# Patient Record
Sex: MALE | Race: WHITE | NOT HISPANIC OR LATINO | URBAN - METROPOLITAN AREA
[De-identification: names, ages, dates, MRNs, and addresses within clinical notes are randomized per-mention and may not be internally consistent; named-entity substitution may affect disease eponyms.]

---

## 2017-07-18 ENCOUNTER — FOLLOW UP ESTABLISHED (OUTPATIENT)
Dept: URBAN - METROPOLITAN AREA CLINIC 51 | Facility: CLINIC | Age: 72
End: 2017-07-18
Payer: COMMERCIAL

## 2017-07-18 DIAGNOSIS — H35.372 PUCKERING OF MACULA, LEFT EYE: ICD-10-CM

## 2017-07-18 DIAGNOSIS — H16.143 PUNCTATE KERATITIS, BILATERAL: ICD-10-CM

## 2017-07-18 DIAGNOSIS — I10 ESSENTIAL (PRIMARY) HYPERTENSION: ICD-10-CM

## 2017-07-18 DIAGNOSIS — E11.9 TYPE 2 DIABETES MELLITUS WITHOUT COMPLICATIONS: Primary | ICD-10-CM

## 2017-07-18 DIAGNOSIS — H52.4 PRESBYOPIA: ICD-10-CM

## 2017-07-18 PROCEDURE — 92014 COMPRE OPH EXAM EST PT 1/>: CPT | Performed by: OPTOMETRIST

## 2017-07-18 PROCEDURE — 92250 FUNDUS PHOTOGRAPHY W/I&R: CPT | Performed by: OPTOMETRIST

## 2017-07-18 ASSESSMENT — KERATOMETRY
OD: 43.63
OS: 43.35

## 2017-07-18 ASSESSMENT — VISUAL ACUITY
OD: 20/30
OS: 20/20

## 2017-07-18 ASSESSMENT — INTRAOCULAR PRESSURE
OD: 11
OS: 10

## 2018-07-16 ENCOUNTER — FOLLOW UP ESTABLISHED (OUTPATIENT)
Dept: URBAN - METROPOLITAN AREA CLINIC 51 | Facility: CLINIC | Age: 73
End: 2018-07-16
Payer: COMMERCIAL

## 2018-07-16 DIAGNOSIS — H35.373 PUCKERING OF MACULA, BILATERAL: ICD-10-CM

## 2018-07-16 DIAGNOSIS — Z96.1 PRESENCE OF INTRAOCULAR LENS: ICD-10-CM

## 2018-07-16 PROCEDURE — 92015 DETERMINE REFRACTIVE STATE: CPT | Performed by: OPTOMETRIST

## 2018-07-16 PROCEDURE — 92014 COMPRE OPH EXAM EST PT 1/>: CPT | Performed by: OPTOMETRIST

## 2018-07-16 PROCEDURE — 92134 CPTRZ OPH DX IMG PST SGM RTA: CPT | Performed by: OPTOMETRIST

## 2018-07-16 ASSESSMENT — VISUAL ACUITY
OS: 20/25
OD: 20/40

## 2018-07-16 ASSESSMENT — KERATOMETRY
OS: 43.70
OD: 43.75

## 2018-07-16 ASSESSMENT — INTRAOCULAR PRESSURE
OS: 10
OD: 11

## 2019-09-03 ENCOUNTER — FOLLOW UP ESTABLISHED (OUTPATIENT)
Dept: URBAN - METROPOLITAN AREA CLINIC 51 | Facility: CLINIC | Age: 74
End: 2019-09-03
Payer: COMMERCIAL

## 2019-09-03 DIAGNOSIS — H25.811 COMBINED FORMS OF AGE-RELATED CATARACT, RIGHT EYE: ICD-10-CM

## 2019-09-03 DIAGNOSIS — H04.123 TEAR FILM INSUFFICIENCY OF BILATERAL LACRIMAL GLANDS: ICD-10-CM

## 2019-09-03 PROCEDURE — 92015 DETERMINE REFRACTIVE STATE: CPT | Performed by: OPTOMETRIST

## 2019-09-03 PROCEDURE — 92014 COMPRE OPH EXAM EST PT 1/>: CPT | Performed by: OPTOMETRIST

## 2019-09-03 PROCEDURE — 92134 CPTRZ OPH DX IMG PST SGM RTA: CPT | Performed by: OPTOMETRIST

## 2019-09-03 RX ORDER — DIPHENHYDRAMINE HCL 25 MG
CAPSULE ORAL
Qty: 3 | Refills: 4 | Status: INACTIVE
Start: 2019-09-03 | End: 2020-10-30

## 2019-09-03 ASSESSMENT — VISUAL ACUITY
OS: 20/20
OD: 20/30

## 2019-09-03 ASSESSMENT — INTRAOCULAR PRESSURE
OD: 12
OS: 11

## 2019-09-03 ASSESSMENT — KERATOMETRY
OS: 43.47
OD: 43.44

## 2020-10-30 ENCOUNTER — FOLLOW UP ESTABLISHED (OUTPATIENT)
Dept: URBAN - METROPOLITAN AREA CLINIC 51 | Facility: CLINIC | Age: 75
End: 2020-10-30
Payer: COMMERCIAL

## 2020-10-30 DIAGNOSIS — H01.025 SQUAMOUS BLEPHARITIS OF LEFT LOWER LID: ICD-10-CM

## 2020-10-30 DIAGNOSIS — H43.313 VITREOUS MEMBRANES AND STRANDS, BILATERAL: ICD-10-CM

## 2020-10-30 DIAGNOSIS — H01.024 SQUAMOUS BLEPHARITIS OF LEFT UPPER LID: ICD-10-CM

## 2020-10-30 DIAGNOSIS — H35.371 PUCKERING OF MACULA, RIGHT EYE: ICD-10-CM

## 2020-10-30 DIAGNOSIS — E10.9 TYPE 1 DIABETES MELLITUS WITHOUT COMPLICATIONS: Primary | ICD-10-CM

## 2020-10-30 DIAGNOSIS — H01.021 SQUAMOUS BLEPHARITIS OF RIGHT UPPER LID: ICD-10-CM

## 2020-10-30 DIAGNOSIS — Z79.4 LONG TERM (CURRENT) USE OF INSULIN: ICD-10-CM

## 2020-10-30 DIAGNOSIS — H01.022 SQUAMOUS BLEPHARITIS OF RIGHT LOWER LID: ICD-10-CM

## 2020-10-30 PROCEDURE — 92014 COMPRE OPH EXAM EST PT 1/>: CPT | Performed by: OPTOMETRIST

## 2020-10-30 PROCEDURE — 92134 CPTRZ OPH DX IMG PST SGM RTA: CPT | Performed by: OPTOMETRIST

## 2020-10-30 RX ORDER — DULAGLUTIDE 0.75 MG/.5ML
INJECTION, SOLUTION SUBCUTANEOUS
Qty: 0 | Refills: 0 | Status: INACTIVE
Start: 2020-10-30 | End: 2021-12-08

## 2020-10-30 ASSESSMENT — KERATOMETRY
OS: 43.44
OD: 43.10

## 2020-10-30 ASSESSMENT — VISUAL ACUITY
OS: 20/20
OD: 20/40

## 2020-10-30 ASSESSMENT — INTRAOCULAR PRESSURE
OS: 12
OD: 12

## 2020-11-19 ENCOUNTER — NEW PATIENT (OUTPATIENT)
Dept: URBAN - METROPOLITAN AREA CLINIC 51 | Facility: CLINIC | Age: 75
End: 2020-11-19
Payer: COMMERCIAL

## 2020-11-19 DIAGNOSIS — G45.9 TRANSIENT CEREBRAL ISCHEMIC ATTACK, UNSPECIFIED: ICD-10-CM

## 2020-11-19 DIAGNOSIS — I48.91 UNSPECIFIED ATRIAL FIBRILLATION: ICD-10-CM

## 2020-11-19 DIAGNOSIS — E11.3293 DIABETES MELLITUS TYPE 2 WITH MILD NON-PROLIFERATI: ICD-10-CM

## 2020-11-19 DIAGNOSIS — Z79.84 LONG TERM (CURRENT) USE OF ORAL ANTIDIABETIC DRUGS: ICD-10-CM

## 2020-11-19 PROCEDURE — 92134 CPTRZ OPH DX IMG PST SGM RTA: CPT | Performed by: OPHTHALMOLOGY

## 2020-11-19 PROCEDURE — 99202 OFFICE O/P NEW SF 15 MIN: CPT | Performed by: OPHTHALMOLOGY

## 2020-11-19 PROCEDURE — 92235 FLUORESCEIN ANGRPH MLTIFRAME: CPT | Performed by: OPHTHALMOLOGY

## 2020-11-19 RX ORDER — OFLOXACIN 3 MG/ML
0.3 % SOLUTION/ DROPS OPHTHALMIC
Qty: 1 | Refills: 0 | Status: INACTIVE
Start: 2020-11-19 | End: 2020-11-25

## 2020-11-19 RX ORDER — PREDNISOLONE ACETATE 10 MG/ML
1 % SUSPENSION/ DROPS OPHTHALMIC
Qty: 1 | Refills: 0 | Status: INACTIVE
Start: 2020-11-19 | End: 2021-10-07

## 2020-11-19 ASSESSMENT — INTRAOCULAR PRESSURE
OD: 5
OS: 5

## 2021-10-07 ENCOUNTER — OFFICE VISIT (OUTPATIENT)
Dept: URBAN - METROPOLITAN AREA CLINIC 51 | Facility: CLINIC | Age: 76
End: 2021-10-07
Payer: COMMERCIAL

## 2021-10-07 DIAGNOSIS — H01.02A SQUAMOUS BLEPHARITIS RIGHT EYE, UPPER AND LOWER EYELIDS: ICD-10-CM

## 2021-10-07 DIAGNOSIS — H01.02B SQUAMOUS BLEPHARITIS LEFT EYE, UPPER AND LOWER EYELIDS: ICD-10-CM

## 2021-10-07 PROCEDURE — 92250 FUNDUS PHOTOGRAPHY W/I&R: CPT | Performed by: OPTOMETRIST

## 2021-10-07 PROCEDURE — 99214 OFFICE O/P EST MOD 30 MIN: CPT | Performed by: OPTOMETRIST

## 2021-10-07 ASSESSMENT — VISUAL ACUITY
OD: 20/30
OS: 20/20

## 2021-10-07 ASSESSMENT — INTRAOCULAR PRESSURE
OS: 9
OD: 9

## 2021-10-07 NOTE — IMPRESSION/PLAN
Impression: Squamous blepharitis right eye, upper and lower eyelids: H01.02A. Plan: See Plan H01. 02A

## 2021-10-07 NOTE — IMPRESSION/PLAN
Impression: Puckering of macula, bilateral: H35.373. Plan: OCT Macular Scan completed, reviewed and documented. Check annually or sooner if patient notices distortions or if there is a decline in vision. 
Understands this may limit BCVA post op OD

## 2021-10-07 NOTE — IMPRESSION/PLAN
Impression: Squamous blepharitis left eye, upper and lower eyelids: H01.02B. Plan: Educated patient regarding condition and importance of maintaining lid hygiene especially before and after cataract surgery.

## 2021-10-07 NOTE — IMPRESSION/PLAN
Impression: Type 2 diabetes mellitus without complications: D22.3. Plan: No Background Diabetic Retinopathy, no Diabetic Macular Edema and no Neovascularization of the iris, disc, or elsewhere. Disc. ocular and systemic benefits of blood sugar control. The American Diabetes Association recommends target glycohemoglobin at 7.0% or less to minimize incidence of retinopathy as well as other systemic complications of diabetes mellitus. Send notes to PCP. Check annually.

## 2021-10-07 NOTE — IMPRESSION/PLAN
Impression: Combined forms of age-related cataract, right eye Plan: Discussed cataracts with patient. Discussed treatment options. Surgical treatment is recommended. Surgical risks and benefits discussed. Patient elects surgical treatment. Recommend surgery OD. standard lens, Aim OD: plano. Patient will need glasses for all near work, including computer. Pt is not a candidate for Specialty IOL due to standard IOL already implanted in other eye (1st eye). He inquired about NEAR AIM OD, not a candidate. He understands. Medication Adherence Call    Patient was called today to discuss medication adherence with atorvastatin, as the patient was identified as having care opportunities.     The patient denies issues with adherence and denies any barriers to receiving medications. He is calling in his refill today.    Sonia Chase, PharmD  Population Health Pharmacist  06/29/21

## 2021-10-12 ENCOUNTER — OFFICE VISIT (OUTPATIENT)
Dept: URBAN - METROPOLITAN AREA CLINIC 51 | Facility: CLINIC | Age: 76
End: 2021-10-12
Payer: COMMERCIAL

## 2021-10-12 DIAGNOSIS — H43.813 VITREOUS DEGENERATION, BILATERAL: ICD-10-CM

## 2021-10-12 PROCEDURE — 92134 CPTRZ OPH DX IMG PST SGM RTA: CPT | Performed by: OPHTHALMOLOGY

## 2021-10-12 PROCEDURE — 99214 OFFICE O/P EST MOD 30 MIN: CPT | Performed by: OPHTHALMOLOGY

## 2021-10-12 ASSESSMENT — INTRAOCULAR PRESSURE
OD: 11
OS: 11

## 2021-10-12 NOTE — IMPRESSION/PLAN
Impression: Puckering of macula, bilateral: H35.373. Plan: moderate ERM OD>OS. unclear visual significance due to cataract OD. recommend proceed with cataract surgery OD and if vision is still limited reassess with retina RTC cataract surgery OD, PRN retina

## 2021-10-12 NOTE — IMPRESSION/PLAN
Impression: Combined forms of age-related cataract, right eye: H25.811.  Plan: visually significant, recommend cataract surgery OD

## 2021-12-08 ENCOUNTER — TESTING ONLY (OUTPATIENT)
Dept: URBAN - METROPOLITAN AREA CLINIC 51 | Facility: CLINIC | Age: 76
End: 2021-12-08
Payer: COMMERCIAL

## 2021-12-08 DIAGNOSIS — H25.11 AGE-RELATED NUCLEAR CATARACT, RIGHT EYE: Primary | ICD-10-CM

## 2021-12-08 DIAGNOSIS — Z01.818 ENCOUNTER FOR OTHER PREPROCEDURAL EXAMINATION: Primary | ICD-10-CM

## 2021-12-08 PROCEDURE — 99203 OFFICE O/P NEW LOW 30 MIN: CPT | Performed by: PHYSICIAN ASSISTANT

## 2021-12-08 PROCEDURE — 92025 CPTRIZED CORNEAL TOPOGRAPHY: CPT | Performed by: OPHTHALMOLOGY

## 2021-12-15 ENCOUNTER — PRE-OPERATIVE VISIT (OUTPATIENT)
Dept: URBAN - METROPOLITAN AREA CLINIC 44 | Facility: CLINIC | Age: 76
End: 2021-12-15
Payer: COMMERCIAL

## 2021-12-15 DIAGNOSIS — H52.221 REGULAR ASTIGMATISM, RIGHT EYE: ICD-10-CM

## 2021-12-15 PROCEDURE — 99214 OFFICE O/P EST MOD 30 MIN: CPT | Performed by: OPHTHALMOLOGY

## 2021-12-15 ASSESSMENT — PACHYMETRY
OD: 2.69
OD: 24.75

## 2021-12-15 NOTE — IMPRESSION/PLAN
Impression: Combined forms of age-related cataract, right eye: H25.811. Plan: Discussed cataract diagnosis with the patient. Discussed and reviewed treatment options for cataracts. Surgical treatment is required for cataracts. Risks and benefits of surgical treatment were discussed and understood. Patient elects surgical treatment. Recommend surgery OD. PLAN STD LENS, DISTANCE AIM, NO UPGRADES, REVIEWED SHAWN, PLAN LRI. PATIENT UNDERSTANDS THE NEED FOR GLASSES POST-OP FOR BEST OVER ALL VISION.  Discussed limitations to vision post op due to ERM, DRY EYES, NIDDM,  INJECTABLE MEDICATION

## 2022-01-05 ENCOUNTER — SURGERY (OUTPATIENT)
Dept: URBAN - METROPOLITAN AREA SURGERY 19 | Facility: SURGERY | Age: 77
End: 2022-01-05
Payer: COMMERCIAL

## 2022-01-05 PROCEDURE — 66984 XCAPSL CTRC RMVL W/O ECP: CPT | Performed by: OPHTHALMOLOGY

## 2022-01-06 ENCOUNTER — POST-OPERATIVE VISIT (OUTPATIENT)
Dept: URBAN - METROPOLITAN AREA CLINIC 51 | Facility: CLINIC | Age: 77
End: 2022-01-06
Payer: COMMERCIAL

## 2022-01-06 PROCEDURE — 99024 POSTOP FOLLOW-UP VISIT: CPT | Performed by: OPTOMETRIST

## 2022-01-06 ASSESSMENT — INTRAOCULAR PRESSURE: OD: 11

## 2022-01-06 NOTE — IMPRESSION/PLAN
Impression: S/P Cataract Extraction by phacoemulsification with IOL placement; LRI (Limbal Relaxing Incision) OD - 1 Day. Presence of intraocular lens  Z96.1. Plan: Doing well POD1. Swelling and inflammation noted on today's examination. Discussed with patient that vision will continue to improve as swelling and inflammation resolves. Recommend ATs when eyes feel dry/gritty, itchy, or water. RTC as scheduled.

## 2022-01-27 ENCOUNTER — POST-OPERATIVE VISIT (OUTPATIENT)
Dept: URBAN - METROPOLITAN AREA CLINIC 51 | Facility: CLINIC | Age: 77
End: 2022-01-27
Payer: COMMERCIAL

## 2022-01-27 PROCEDURE — 99024 POSTOP FOLLOW-UP VISIT: CPT | Performed by: OPTOMETRIST

## 2022-01-27 ASSESSMENT — VISUAL ACUITY
OS: 20/20
OD: 20/30

## 2022-01-27 ASSESSMENT — INTRAOCULAR PRESSURE
OD: 8
OS: 10

## 2022-01-27 NOTE — IMPRESSION/PLAN
Impression: S/P Cataract Extraction by phacoemulsification with IOL placement; LRI (Limbal Relaxing Incision) OD - 22 Days. Presence of intraocular lens  Z96.1. Plan: Discussed options on updating new MRx. Patient prefers: MRx Released prescription for [facility] 
Artificial tears QID OU for comfort.

## 2023-02-10 ENCOUNTER — OFFICE VISIT (OUTPATIENT)
Dept: URBAN - METROPOLITAN AREA CLINIC 51 | Facility: CLINIC | Age: 78
End: 2023-02-10
Payer: COMMERCIAL

## 2023-02-10 DIAGNOSIS — H02.834 DERMATOCHALASIS OF LEFT UPPER EYELID: ICD-10-CM

## 2023-02-10 DIAGNOSIS — Z79.84 LONG TERM (CURRENT) USE OF ORAL HYPOGLYCEMIC DRUGS: ICD-10-CM

## 2023-02-10 DIAGNOSIS — H52.4 PRESBYOPIA: ICD-10-CM

## 2023-02-10 DIAGNOSIS — E11.9 TYPE 2 DIABETES MELLITUS WITHOUT COMPLICATIONS: Primary | ICD-10-CM

## 2023-02-10 DIAGNOSIS — H35.373 PUCKERING OF MACULA, BILATERAL: ICD-10-CM

## 2023-02-10 DIAGNOSIS — H02.831 DERMATOCHALASIS OF RIGHT UPPER EYELID: ICD-10-CM

## 2023-02-10 DIAGNOSIS — H04.123 TEAR FILM INSUFFICIENCY OF BILATERAL LACRIMAL GLANDS: ICD-10-CM

## 2023-02-10 DIAGNOSIS — Z96.1 PRESENCE OF INTRAOCULAR LENS: ICD-10-CM

## 2023-02-10 DIAGNOSIS — H43.813 VITREOUS DEGENERATION, BILATERAL: ICD-10-CM

## 2023-02-10 PROCEDURE — 92250 FUNDUS PHOTOGRAPHY W/I&R: CPT | Performed by: OPTOMETRIST

## 2023-02-10 PROCEDURE — 92134 CPTRZ OPH DX IMG PST SGM RTA: CPT | Performed by: OPTOMETRIST

## 2023-02-10 PROCEDURE — 99214 OFFICE O/P EST MOD 30 MIN: CPT | Performed by: OPTOMETRIST

## 2023-02-10 ASSESSMENT — VISUAL ACUITY
OS: 20/25
OD: 20/25

## 2023-02-10 ASSESSMENT — INTRAOCULAR PRESSURE
OS: 13
OD: 12

## 2023-02-10 ASSESSMENT — KERATOMETRY
OS: 43.63
OD: 43.50

## 2023-02-10 NOTE — IMPRESSION/PLAN
Impression: Presence of intraocular lens: Z96.1 Plan: Well centered IOLs, status quo OU.  [address] OD is not affecting vision or causing any functional vision decline

## 2023-02-10 NOTE — IMPRESSION/PLAN
Impression: Type 2 diabetes mellitus without complications: B70.2. Plan: No Background Diabetic Retinopathy, no Diabetic Macular Edema and no Neovascularization of the iris, disc, or elsewhere. Disc. ocular and systemic benefits of blood sugar control. The American Diabetes Association recommends target glycohemoglobin at 7.0% or less to minimize incidence of retinopathy as well as other systemic complications of diabetes mellitus. Send notes to PCP. Check annually.

## 2023-02-10 NOTE — IMPRESSION/PLAN
Impression: Puckering of macula, bilateral: H35.373. Plan: OCT Macular Scan completed, reviewed and documented. Stable, no changes. Check annually or sooner if patient notices distortions or if there is a decline in vision.